# Patient Record
Sex: FEMALE | Race: WHITE | Employment: UNEMPLOYED | URBAN - METROPOLITAN AREA
[De-identification: names, ages, dates, MRNs, and addresses within clinical notes are randomized per-mention and may not be internally consistent; named-entity substitution may affect disease eponyms.]

---

## 2017-11-01 ENCOUNTER — HOSPITAL ENCOUNTER (OUTPATIENT)
Age: 57
Discharge: HOME | End: 2017-11-01
Payer: COMMERCIAL

## 2017-11-01 LAB
T3 FREE: 2.73 PG/ML (ref 2.02–4.43)
THYROXINE, FREE: 0.98 NG/DL (ref 0.93–1.7)
TSH SERPL DL<=0.05 MIU/L-ACNC: 2.22 MIU/L (ref 0.3–5)

## 2017-11-01 PROCEDURE — 36415 COLL VENOUS BLD VENIPUNCTURE: CPT

## 2017-11-01 PROCEDURE — 84481 FREE ASSAY (FT-3): CPT

## 2017-11-01 PROCEDURE — 84443 ASSAY THYROID STIM HORMONE: CPT

## 2017-11-01 PROCEDURE — 84439 ASSAY OF FREE THYROXINE: CPT

## 2017-11-09 ENCOUNTER — HOSPITAL ENCOUNTER (OUTPATIENT)
Age: 57
Discharge: HOME | End: 2017-11-09
Payer: COMMERCIAL

## 2017-11-09 LAB
ALBUMIN SERPL-MCNC: 4.3 G/DL (ref 3.5–5.2)
ALBUMIN/GLOBULIN RATIO: ABNORMAL (ref 1–2.5)
ALP BLD-CCNC: 71 U/L (ref 35–104)
ALT SERPL-CCNC: 13 U/L (ref 5–33)
AMOUNT GLUCOSE GIVEN: 75 G
ANION GAP SERPL CALCULATED.3IONS-SCNC: 11 MMOL/L (ref 9–17)
AST SERPL-CCNC: 16 U/L
BILIRUB SERPL-MCNC: 0.24 MG/DL (ref 0.3–1.2)
BUN BLDV-MCNC: 11 MG/DL (ref 6–20)
BUN/CREAT BLD: 14 (ref 9–20)
CALCIUM SERPL-MCNC: 9.2 MG/DL (ref 8.6–10.4)
CHLORIDE BLD-SCNC: 105 MMOL/L (ref 98–107)
CHOLESTEROL, FASTING: 261 MG/DL
CHOLESTEROL/HDL RATIO: 5.8
CO2: 26 MMOL/L (ref 20–31)
CREAT SERPL-MCNC: 0.78 MG/DL (ref 0.5–0.9)
GFR AFRICAN AMERICAN: >60 ML/MIN
GFR NON-AFRICAN AMERICAN: >60 ML/MIN
GFR SERPL CREATININE-BSD FRML MDRD: ABNORMAL ML/MIN/{1.73_M2}
GFR SERPL CREATININE-BSD FRML MDRD: ABNORMAL ML/MIN/{1.73_M2}
GLUCOSE FASTING: 94 MG/DL (ref 70–99)
GLUCOSE FASTING: 95 MG/DL (ref 65–99)
GLUCOSE TOLERANCE TEST 1 HOUR: 103 MG/DL (ref 65–184)
GLUCOSE TOLERANCE TEST 2 HOUR: 103 MG/DL (ref 65–139)
GLUCOSE TOLERANCE TEST 3 HOUR: 60 MG/DL (ref 65–130)
GLUCOSE, 4 HOUR: 84 MG/DL (ref 65–125)
GLUCOSE, 5 HR: 85 MG/DL (ref 65–109)
HCT VFR BLD CALC: 47.5 % (ref 36–46)
HDLC SERPL-MCNC: 45 MG/DL
HEMOGLOBIN: 15.9 G/DL (ref 12–16)
IRON SATURATION: 35 % (ref 20–55)
IRON: 99 UG/DL (ref 37–145)
LDL CHOLESTEROL: 161 MG/DL (ref 0–130)
MCH RBC QN AUTO: 30.8 PG (ref 26–34)
MCHC RBC AUTO-ENTMCNC: 33.6 G/DL (ref 31–37)
MCV RBC AUTO: 91.7 FL (ref 80–100)
PDW BLD-RTO: 13.2 % (ref 11.5–14.5)
PLATELET # BLD: 247 K/UL (ref 130–400)
PMV BLD AUTO: 7.9 FL (ref 6–12)
POTASSIUM SERPL-SCNC: 4 MMOL/L (ref 3.7–5.3)
RBC # BLD: 5.18 M/UL (ref 4–5.2)
SODIUM BLD-SCNC: 142 MMOL/L (ref 135–144)
TOTAL IRON BINDING CAPACITY: 282 UG/DL (ref 250–450)
TOTAL PROTEIN: 6.8 G/DL (ref 6.4–8.3)
TRIGLYCERIDE, FASTING: 275 MG/DL
UNSATURATED IRON BINDING CAPACITY: 183 UG/DL (ref 112–347)
VITAMIN D 25-HYDROXY: 35.5 NG/ML (ref 30–100)
VLDLC SERPL CALC-MCNC: ABNORMAL MG/DL (ref 1–30)
WBC # BLD: 7.6 K/UL (ref 3.5–11)

## 2017-11-09 PROCEDURE — 82627 DEHYDROEPIANDROSTERONE: CPT

## 2017-11-09 PROCEDURE — 83550 IRON BINDING TEST: CPT

## 2017-11-09 PROCEDURE — 82952 GTT-ADDED SAMPLES: CPT

## 2017-11-09 PROCEDURE — 82306 VITAMIN D 25 HYDROXY: CPT

## 2017-11-09 PROCEDURE — 85027 COMPLETE CBC AUTOMATED: CPT

## 2017-11-09 PROCEDURE — 80053 COMPREHEN METABOLIC PANEL: CPT

## 2017-11-09 PROCEDURE — 80061 LIPID PANEL: CPT

## 2017-11-09 PROCEDURE — 82951 GLUCOSE TOLERANCE TEST (GTT): CPT

## 2017-11-09 PROCEDURE — 36415 COLL VENOUS BLD VENIPUNCTURE: CPT

## 2017-11-09 PROCEDURE — 83540 ASSAY OF IRON: CPT

## 2017-11-10 LAB — DHEAS (DHEA SULFATE): 52.1 UG/DL (ref 26–200)

## 2017-12-19 ENCOUNTER — HOSPITAL ENCOUNTER (OUTPATIENT)
Age: 57
Discharge: HOME | End: 2017-12-19
Payer: COMMERCIAL

## 2017-12-19 LAB
ALT SERPL-CCNC: 13 U/L (ref 5–33)
CHOLESTEROL, FASTING: 182 MG/DL
CHOLESTEROL/HDL RATIO: 4.2
HDLC SERPL-MCNC: 43 MG/DL
LDL CHOLESTEROL: 77 MG/DL (ref 0–130)
TRIGLYCERIDE, FASTING: 312 MG/DL
VLDLC SERPL CALC-MCNC: ABNORMAL MG/DL (ref 1–30)

## 2017-12-19 PROCEDURE — 80061 LIPID PANEL: CPT

## 2017-12-19 PROCEDURE — 36415 COLL VENOUS BLD VENIPUNCTURE: CPT

## 2017-12-19 PROCEDURE — 84460 ALANINE AMINO (ALT) (SGPT): CPT

## 2017-12-19 PROCEDURE — 82523 COLLAGEN CROSSLINKS: CPT

## 2017-12-21 LAB
CREATININE URINE /VOLUME: 245 MG/DL
N-TELO/CREAT. RATIO: 56

## 2018-01-08 ENCOUNTER — TELEPHONE (OUTPATIENT)
Dept: ONCOLOGY | Age: 58
End: 2018-01-08

## 2018-01-09 ENCOUNTER — TELEPHONE (OUTPATIENT)
Dept: ONCOLOGY | Age: 58
End: 2018-01-09

## 2018-01-10 DIAGNOSIS — M81.8 IDIOPATHIC OSTEOPOROSIS: ICD-10-CM

## 2018-01-10 DIAGNOSIS — E05.90 THYROTOXICOSIS WITHOUT THYROID STORM, UNSPECIFIED THYROTOXICOSIS TYPE: ICD-10-CM

## 2018-01-15 ENCOUNTER — HOSPITAL ENCOUNTER (OUTPATIENT)
Facility: MEDICAL CENTER | Age: 58
End: 2018-01-15
Payer: COMMERCIAL

## 2018-01-16 ENCOUNTER — HOSPITAL ENCOUNTER (OUTPATIENT)
Dept: INFUSION THERAPY | Facility: MEDICAL CENTER | Age: 58
Discharge: HOME | End: 2018-01-16
Payer: COMMERCIAL

## 2018-01-16 VITALS
RESPIRATION RATE: 18 BRPM | HEART RATE: 57 BPM | DIASTOLIC BLOOD PRESSURE: 70 MMHG | SYSTOLIC BLOOD PRESSURE: 141 MMHG | TEMPERATURE: 97.8 F

## 2018-01-16 DIAGNOSIS — E05.90 THYROTOXICOSIS WITHOUT THYROID STORM, UNSPECIFIED THYROTOXICOSIS TYPE: ICD-10-CM

## 2018-01-16 DIAGNOSIS — M81.8 IDIOPATHIC OSTEOPOROSIS: ICD-10-CM

## 2018-01-16 LAB
CORTISOL COLLECTION INFO: NORMAL
CORTISOL COLLECTION INFO: NORMAL
CORTISOL: 13.7 UG/DL (ref 2.7–18.4)
CORTISOL: 16.2 UG/DL (ref 2.7–18.4)

## 2018-01-16 PROCEDURE — 36415 COLL VENOUS BLD VENIPUNCTURE: CPT

## 2018-01-16 PROCEDURE — 2580000003 HC RX 258: Performed by: INTERNAL MEDICINE

## 2018-01-16 PROCEDURE — 96374 THER/PROPH/DIAG INJ IV PUSH: CPT

## 2018-01-16 PROCEDURE — 96372 THER/PROPH/DIAG INJ SC/IM: CPT

## 2018-01-16 PROCEDURE — 82533 TOTAL CORTISOL: CPT

## 2018-01-16 PROCEDURE — 6360000002 HC RX W HCPCS: Performed by: INTERNAL MEDICINE

## 2018-01-16 RX ADMIN — COSYNTROPIN 1 MCG: 0.25 INJECTION, POWDER, LYOPHILIZED, FOR SOLUTION INTRAMUSCULAR; INTRAVENOUS at 10:29

## 2018-01-16 NOTE — PROGRESS NOTES
Pt. Arrives for Cortrosyn stim testing , Pt. Instructed and IV started with ease. Cortrosyn given and lab notified of 30 minute draw and 60 minute draw. Blood collected  Pt. Discharged per ambulatory.

## 2018-02-23 ENCOUNTER — HOSPITAL ENCOUNTER (OUTPATIENT)
Age: 58
Discharge: HOME | End: 2018-02-23
Payer: COMMERCIAL

## 2018-02-23 LAB
ALT SERPL-CCNC: 11 U/L (ref 5–33)
CHOLESTEROL, FASTING: 197 MG/DL
CHOLESTEROL/HDL RATIO: 3.9
HDLC SERPL-MCNC: 51 MG/DL
LDL CHOLESTEROL: 108 MG/DL (ref 0–130)
T3 FREE: 2.61 PG/ML (ref 2.02–4.43)
THYROXINE, FREE: 1.01 NG/DL (ref 0.93–1.7)
TRIGLYCERIDE, FASTING: 190 MG/DL
TSH SERPL DL<=0.05 MIU/L-ACNC: 5.62 MIU/L (ref 0.3–5)
VLDLC SERPL CALC-MCNC: ABNORMAL MG/DL (ref 1–30)

## 2018-02-23 PROCEDURE — 84481 FREE ASSAY (FT-3): CPT

## 2018-02-23 PROCEDURE — 84443 ASSAY THYROID STIM HORMONE: CPT

## 2018-02-23 PROCEDURE — 36415 COLL VENOUS BLD VENIPUNCTURE: CPT

## 2018-02-23 PROCEDURE — 80061 LIPID PANEL: CPT

## 2018-02-23 PROCEDURE — 84439 ASSAY OF FREE THYROXINE: CPT

## 2018-02-23 PROCEDURE — 84460 ALANINE AMINO (ALT) (SGPT): CPT

## 2018-03-06 ENCOUNTER — HOSPITAL ENCOUNTER (OUTPATIENT)
Age: 58
Discharge: HOME | End: 2018-03-06
Payer: COMMERCIAL

## 2018-03-06 PROCEDURE — 36415 COLL VENOUS BLD VENIPUNCTURE: CPT

## 2018-03-06 PROCEDURE — 82024 ASSAY OF ACTH: CPT

## 2018-03-07 LAB — ADRENOCORTICOTROPIC HORMONE: 12 PG/ML (ref 6–58)

## 2018-05-12 ENCOUNTER — HOSPITAL ENCOUNTER (OUTPATIENT)
Dept: MAMMOGRAPHY | Age: 58
Discharge: HOME | End: 2018-05-14
Payer: COMMERCIAL

## 2018-05-12 DIAGNOSIS — Z13.820 SCREENING FOR OSTEOPOROSIS: ICD-10-CM

## 2018-05-12 PROCEDURE — 77080 DXA BONE DENSITY AXIAL: CPT

## 2018-06-05 ENCOUNTER — HOSPITAL ENCOUNTER (OUTPATIENT)
Age: 58
Discharge: HOME | End: 2018-06-05
Payer: COMMERCIAL

## 2018-06-05 LAB
ALBUMIN SERPL-MCNC: 4.8 G/DL (ref 3.5–5.2)
ALBUMIN/GLOBULIN RATIO: ABNORMAL (ref 1–2.5)
ALP BLD-CCNC: 47 U/L (ref 35–104)
ALT SERPL-CCNC: 18 U/L (ref 5–33)
ANION GAP SERPL CALCULATED.3IONS-SCNC: 11 MMOL/L (ref 9–17)
AST SERPL-CCNC: 20 U/L
BILIRUB SERPL-MCNC: 0.22 MG/DL (ref 0.3–1.2)
BUN BLDV-MCNC: 19 MG/DL (ref 6–20)
BUN/CREAT BLD: 19 (ref 9–20)
CALCIUM SERPL-MCNC: 9.1 MG/DL (ref 8.6–10.4)
CHLORIDE BLD-SCNC: 105 MMOL/L (ref 98–107)
CHOLESTEROL, FASTING: 193 MG/DL
CHOLESTEROL/HDL RATIO: 3.1
CO2: 27 MMOL/L (ref 20–31)
CREAT SERPL-MCNC: 0.99 MG/DL (ref 0.5–0.9)
GFR AFRICAN AMERICAN: >60 ML/MIN
GFR NON-AFRICAN AMERICAN: 58 ML/MIN
GFR SERPL CREATININE-BSD FRML MDRD: ABNORMAL ML/MIN/{1.73_M2}
GFR SERPL CREATININE-BSD FRML MDRD: ABNORMAL ML/MIN/{1.73_M2}
GLUCOSE FASTING: 104 MG/DL (ref 70–99)
HDLC SERPL-MCNC: 63 MG/DL
LDL CHOLESTEROL: 104 MG/DL (ref 0–130)
POTASSIUM SERPL-SCNC: 4 MMOL/L (ref 3.7–5.3)
PTH INTACT: 43.31 PG/ML (ref 15–65)
SODIUM BLD-SCNC: 143 MMOL/L (ref 135–144)
T3 FREE: 2.83 PG/ML (ref 2.02–4.43)
THYROXINE, FREE: 1.19 NG/DL (ref 0.93–1.7)
TOTAL PROTEIN: 7.3 G/DL (ref 6.4–8.3)
TRIGLYCERIDE, FASTING: 131 MG/DL
TSH SERPL DL<=0.05 MIU/L-ACNC: 6.18 MIU/L (ref 0.3–5)
VITAMIN D 25-HYDROXY: 41.5 NG/ML (ref 30–100)
VLDLC SERPL CALC-MCNC: NORMAL MG/DL (ref 1–30)

## 2018-06-05 PROCEDURE — 36415 COLL VENOUS BLD VENIPUNCTURE: CPT

## 2018-06-05 PROCEDURE — 84443 ASSAY THYROID STIM HORMONE: CPT

## 2018-06-05 PROCEDURE — 84439 ASSAY OF FREE THYROXINE: CPT

## 2018-06-05 PROCEDURE — 83970 ASSAY OF PARATHORMONE: CPT

## 2018-06-05 PROCEDURE — 80061 LIPID PANEL: CPT

## 2018-06-05 PROCEDURE — 82306 VITAMIN D 25 HYDROXY: CPT

## 2018-06-05 PROCEDURE — 84481 FREE ASSAY (FT-3): CPT

## 2018-06-05 PROCEDURE — 82523 COLLAGEN CROSSLINKS: CPT

## 2018-06-05 PROCEDURE — 80053 COMPREHEN METABOLIC PANEL: CPT

## 2018-06-07 LAB
CREATININE URINE /VOLUME: 233 MG/DL
N-TELO/CREAT. RATIO: 15

## 2024-05-21 ENCOUNTER — OFFICE VISIT (OUTPATIENT)
Dept: ORTHOPEDIC SURGERY | Age: 64
End: 2024-05-21
Payer: COMMERCIAL

## 2024-05-21 VITALS — BODY MASS INDEX: 20.94 KG/M2 | WEIGHT: 122 LBS

## 2024-05-21 DIAGNOSIS — M25.552 LEFT HIP PAIN: Primary | ICD-10-CM

## 2024-05-21 DIAGNOSIS — F17.200 SMOKER: ICD-10-CM

## 2024-05-21 DIAGNOSIS — G89.29 CHRONIC BILATERAL LOW BACK PAIN WITHOUT SCIATICA: ICD-10-CM

## 2024-05-21 DIAGNOSIS — M54.50 CHRONIC BILATERAL LOW BACK PAIN WITHOUT SCIATICA: ICD-10-CM

## 2024-05-21 PROCEDURE — G8420 CALC BMI NORM PARAMETERS: HCPCS | Performed by: PHYSICIAN ASSISTANT

## 2024-05-21 PROCEDURE — 4004F PT TOBACCO SCREEN RCVD TLK: CPT | Performed by: PHYSICIAN ASSISTANT

## 2024-05-21 PROCEDURE — 3017F COLORECTAL CA SCREEN DOC REV: CPT | Performed by: PHYSICIAN ASSISTANT

## 2024-05-21 PROCEDURE — G8428 CUR MEDS NOT DOCUMENT: HCPCS | Performed by: PHYSICIAN ASSISTANT

## 2024-05-21 PROCEDURE — 99203 OFFICE O/P NEW LOW 30 MIN: CPT | Performed by: PHYSICIAN ASSISTANT

## 2024-05-21 RX ORDER — CYCLOBENZAPRINE HCL 10 MG
10 TABLET ORAL 3 TIMES DAILY PRN
Qty: 21 TABLET | Refills: 0 | Status: SHIPPED | OUTPATIENT
Start: 2024-05-21 | End: 2024-05-31

## 2024-05-21 NOTE — PROGRESS NOTES
Patient ID: Ghada Gillis is a 63 y.o. female    Chief Compliant:  Chief Complaint   Patient presents with    Leg Pain     NP: Lt leg/hip pain      HPI:  Patient is a 63 y.o. female who presents to the clinic today for for evaluation of low back pain with left leg numbness and tingling.  Patient reports that most of her pain is to the lateral aspect of her left hip.  Patient has had ongoing pain for the past 2 months with any fall trauma or injury.  Patient's pain is alleviated with sitting but worse with standing and walking.  She also saw her internal medicine doctor on 5/14 who ordered x-rays of her low back and hip.  X-rays demonstrated some mild arthritis of her left hip as well as some multilevel degenerative changes throughout her lumbar spine no evidence of any fractures or osseous abnormality.  Patient denies any fever, chills, bowel or bladder concern, saddle anesthesia.      Review of Systems   Constitutional: Negative for fever, chills, sweats.   Eyes: Negative for changes in vision, or pain.   HENT: Negative for ear ache, epistaxis, or sore throat.  Respiratory/Cardio: Negative for Chest pain, palpitations, SOB, or cough.  Gastrointestinal: Negative for abdominal pain, N/V/D.   Genitourinary: Negative for dysuria, frequency, urgency, or hematuria.   Neurological: Negative for headache, numbness, or weakness.   Integumentary: Negative for rash, itching, laceration, or abrasion.   Musculoskeletal: Positive for Leg Pain (NP: Lt leg/hip pain)         Past History:    Current Outpatient Medications:     cyclobenzaprine (FLEXERIL) 10 MG tablet, Take 1 tablet by mouth 3 times daily as needed for Muscle spasms, Disp: 21 tablet, Rfl: 0    ondansetron (ZOFRAN ODT) 4 MG disintegrating tablet, Take 1 tablet by mouth every 8 hours as needed for Nausea, Disp: 20 tablet, Rfl: 0    tiotropium (SPIRIVA) 18 MCG inhalation capsule, Inhale 18 mcg into the lungs daily., Disp: , Rfl:     albuterol (PROVENTIL) (5 MG/ML)

## 2024-05-30 ENCOUNTER — HOSPITAL ENCOUNTER (OUTPATIENT)
Dept: PHYSICAL THERAPY | Age: 64
Setting detail: THERAPIES SERIES
Discharge: HOME | End: 2024-05-30
Attending: PHYSICIAN ASSISTANT
Payer: COMMERCIAL

## 2024-05-30 PROCEDURE — 97161 PT EVAL LOW COMPLEX 20 MIN: CPT

## 2024-05-30 PROCEDURE — 97110 THERAPEUTIC EXERCISES: CPT

## 2024-05-30 NOTE — CONSULTS
with compression  04569    [x] Therapeutic Activity  07693 [x] Cold/hotpack    [x] Gait Training   15082 [x] Lumbar/Cervical Traction  18988   [x] Neuromuscular Re-education  70631 [x] Electrical Stimulation Unattended  37871   [x] Manual Therapy    35924 [x] Electrical Stimulation Attended  90031   [] Iontophoresis: 4 mg/mL Dexamethasone Sodium Phosphate  mAmin  60507 []  Medication allergies reviewed for use of   Dexamethasone Sodium Phosphate 4mg/ml with iontophoresis treatments.Pt is not allergic.   [] Dry Needling, 1 or 2 muscles  20560   [] Dry Needling, 3 or more muscles  20561     Frequency:  2 x/week for 12 visits    Today’s Treatment:  Precautions:  Modalities: hot pack to left hip in supine x 10 min.   Exercises:  Access Code: [email or web address]  URL: [email or web address]  Date: 05/30/2024  Prepared by: Virgil Barton    Exercises  - Static Prone on Elbows  - 1 x daily - 4 x weekly - 3 sets - 10 reps  - Prone Press Up On Elbows  - 1 x daily - 4 x weekly - 3 sets - 10 reps  - Prone Knee Flexion  - 1 x daily - 4 x weekly - 3 sets - 10 reps  - Prone Gluteal Sets  - 1 x daily - 4 x weekly - 3 sets - 10 reps  - Supine Hip Adduction Isometric with Ball  - 1 x daily - 4 x weekly - 3 sets - 10 reps  - Bent Knee Fallouts with Alternating Legs  - 1 x daily - 4 x weekly - 3 sets - 10 reps  - Supine Bridge  - 1 x daily - 4 x weekly - 3 sets - 10 reps    Pain altered Tx:  [] Yes  [x] No  Action:    Pt. Education:  [x] Plans/Goals, Risks/Benefits discussed  [x] Home exercise program:   Method of Education: [x] Verbal  [x] Demo  [x] Written  Comprehension of Education:  [x] Verbalizes understanding.  [x] Demonstrates understanding.  [x] Needs Review.  [] Demonstrates/verbalizes understanding of HEP/Ed previously given.      Specific Instructions for next treatment::L hip ROM, strengthening, core strengthening       Evaluation Complexity:  History (Personal factors, comorbidities) [] 0 [x] 1-2 [] 3+   Exam

## 2024-06-04 ENCOUNTER — HOSPITAL ENCOUNTER (OUTPATIENT)
Dept: PHYSICAL THERAPY | Age: 64
Setting detail: THERAPIES SERIES
Discharge: HOME | End: 2024-06-04
Attending: PHYSICIAN ASSISTANT
Payer: COMMERCIAL

## 2024-06-04 PROCEDURE — 97110 THERAPEUTIC EXERCISES: CPT

## 2024-06-04 NOTE — FLOWSHEET NOTE
[] No  [] Yes  Action:  Comments: Minimal pain this morning but increased to 4/10 while walking into clinic. She still has pain with prolonged walking like attempting to grocery shop.     Objective:  Modalities:  Hot pack declined.   Precautions:  Exercises: Bilateral   Exercise Reps/ Time Weight/ Level Comments   Nustep  5 min  L4  Supervised, discussed subjective, reviewed HEP.          Standing       Calf stretch  3x20\"      4 way hip  15x     Marching  15x     HS curls  15x                       Seated      HS stretch  3x20\"           Supine       Figure 4 stretch  3x20\"      Butterfly stretch  10x10\"      ER/IR  15x  With knee extended    Hip adduction  15x3\"     Hip abd slides  15x     SLR 10x     Heel slides  15x     Bridges    ADD         Sidelying       Clamshells 15x     Hip abduct 15x                       Other:      Treatment Charges: Mins Units   []  Modalities     [x]  Ther Exercise 35 2   []  Manual Therapy     []  Ther Activities     []  Neuro Re-ed     []  Vasocompression     [] Gait     []  Other     Total Billable time 35 2       Assessment: [x] Progressing toward goals. Patient demonstrates fair tolerance to exercises although her hip pain increased during majority of program. It did quickly subside with rest. Her general hip strength and endurance is limited and needs improvement. Declined need for hot pack at end of session.     [] No change.     [] Other:  [x] Patient will benefit from physical therapy to improve L hip and trunk ROM, strength, normalize gait tolerance and improve ADLs.       STG/LTG  STG: (to be met in 8 treatments)  ? Pain: 2/10 L hip and back pain with ambulation.   ? ROM: L hip flexion to 110, abduction to 40, and ER to 35 degrees to improve ADLs.   ? Strength: 4+/5 L hip flexion and abduction to improve joint stability and standing   ? Function: Oswestry score 36% impaired or better to improve ADLs.   Independent with Home Exercise Programs.     LTG: (to be met in 12

## 2024-06-06 ENCOUNTER — HOSPITAL ENCOUNTER (OUTPATIENT)
Dept: PHYSICAL THERAPY | Age: 64
Setting detail: THERAPIES SERIES
Discharge: HOME | End: 2024-06-06
Attending: PHYSICIAN ASSISTANT
Payer: COMMERCIAL

## 2024-06-06 NOTE — FLOWSHEET NOTE
[x] [facility]  Outpatient Rehabilitation &  Therapy  [address]  [phone]  [phone]     Physical Therapy Daily Treatment Note    Date:  2024  Patient Name:  Ghada Gillis    :  1960  MRN: [id number]  Physician: BRIAN Mcduffie                Insurance: [employer], Auth after 12  Medical Diagnosis:   Left hip pain (M25.552) Chronic bilateral low back pain without sciatica (M54.50,G89.29)        Rehab Codes: M25.552, M54.5   Onset Date: 3/3/24                                 Next 's appt: 24    Visit# / total visits: 3/12; Progress note for Medicare patient due at visit 10     Cancels/No Shows: 0/0    Subjective:    Pain:  [x] Yes  [] No Location: L hip   Pain Rating: (0-10 scale) 4/10  Pain altered Tx:  [] No  [] Yes  Action:  Comments: Patient reports continued pain at 4/10. Reports she is sore from previous treatment. Pain is more in lateral hip today as opposed to groin where it usually is.     Objective:  Modalities:  Hot pack declined.   Precautions:  Exercises: Bilateral   Exercise Reps/ Time Weight/ Level Comments   Nustep  5 min  L4           Standing       Calf stretch  3x20\"      4 way hip  15x     Marching  15x     HS curls  15x                       Seated      HS stretch  3x20\"           Supine       Figure 4 stretch  3x20\"      Butterfly stretch  10x10\"      ER/IR  15x  With knee extended    Hip adduction  15x3\"     Hip abd slides  15x     SLR 10x     Heel slides  15x     Bridges  15x  Cues for glut squeeze  Added 6/6         Sidelying       Clamshells 15x     Hip abduct 15x                       Other:      Treatment Charges: Mins Units   []  Modalities     [x]  Ther Exercise 35 2   []  Manual Therapy     []  Ther Activities     []  Neuro Re-ed     []  Vasocompression     [] Gait     []  Other     Total Billable time 35 2       Assessment: [x] Progressing toward goals. Added bridges for continued glut strengthening with good carryover.

## 2024-06-11 ENCOUNTER — HOSPITAL ENCOUNTER (OUTPATIENT)
Dept: PHYSICAL THERAPY | Age: 64
Setting detail: THERAPIES SERIES
Discharge: HOME | End: 2024-06-11
Attending: PHYSICIAN ASSISTANT
Payer: COMMERCIAL

## 2024-06-11 NOTE — FLOWSHEET NOTE
[x] [facility]  Outpatient Rehabilitation &  Therapy  [address]  [phone]  [phone]     Physical Therapy Daily Treatment Note    Date:  2024  Patient Name:  Ghada Gillis    :  1960  MRN: [id number]  Physician: BRIAN Mcduffie                Insurance: [place] [employer] Dual, [facility] after 12  Medical Diagnosis:   Left hip pain (M25.552) Chronic bilateral low back pain without sciatica (M54.50,G89.29)        Rehab Codes: M25.552, M54.5   Onset Date: 3/3/24                                 Next 's appt: 24  Visit# / total visits: ; Progress note for Medicare patient due at visit 10     Cancels/No Shows: 0/0    Subjective:    Pain:  [x] Yes  [] No Location: L hip   Pain Rating: (0-10 scale) 3/10  Pain altered Tx:  [] No  [] Yes  Action:  Comments: Patient reports compliance with HEP still having severe hip pain at times.     Objective:  Modalities:  Hot pack declined.   Precautions:  Exercises: Bilateral   Exercise Reps/ Time Weight/ Level Comments   Nustep  5 min  L4           Standing       Calf stretch  3x20\"      4 way hip  20x     Marching  20x     HS curls  20x                       Seated      HS stretch  3x20\"           Supine     Add weight nxt tx   Figure 4 stretch  3x20\"      Butterfly stretch  10x10\"      ER/IR  20x  With knee extended    Hip adduction  20x3\"     Hip abd slides  20x     SLR 2x10x     Heel slides  20x     Bridges  20x  Cues for glut squeeze  Added 6/6         Sidelying       Clamshells 20x     Hip abduct 20x                       Other:      Treatment Charges: Mins Units   []  Modalities     [x]  Ther Exercise 30 2   []  Manual Therapy     []  Ther Activities     []  Neuro Re-ed     []  Vasocompression     [] Gait     []  Other     Total Billable time 30 2       Assessment: [x] Progressing toward goals. Patient still struggles with standing program citing increased pain but she seemed to tolerate supine exercises well even with increased

## 2024-06-13 ENCOUNTER — HOSPITAL ENCOUNTER (OUTPATIENT)
Dept: PHYSICAL THERAPY | Age: 64
Setting detail: THERAPIES SERIES
Discharge: HOME | End: 2024-06-13
Attending: PHYSICIAN ASSISTANT
Payer: COMMERCIAL

## 2024-06-13 PROCEDURE — 97110 THERAPEUTIC EXERCISES: CPT

## 2024-06-18 ENCOUNTER — HOSPITAL ENCOUNTER (OUTPATIENT)
Dept: PHYSICAL THERAPY | Age: 64
Setting detail: THERAPIES SERIES
Discharge: HOME | End: 2024-06-18
Attending: PHYSICIAN ASSISTANT
Payer: COMMERCIAL

## 2024-06-18 PROCEDURE — 97110 THERAPEUTIC EXERCISES: CPT

## 2024-06-18 NOTE — FLOWSHEET NOTE
Charges: Mins Units   []  Modalities     [x]  Ther Exercise 35 2   []  Manual Therapy     []  Ther Activities     []  Neuro Re-ed     []  Vasocompression     [] Gait     []  Other     Total Billable time 35 2       Assessment: [x] Progressing toward goals. Patient had some breathing issues during standing exercise did have to use her inhaler and take a short rest break. She recovered well and was able to complete the program. L hip ROM and endurance appear to be improving she has no pain complaints during session.     [] No change.     [x] Other: Patient pacing is still an issue, tends to rush through exercise.   [x] Patient will benefit from physical therapy to improve L hip and trunk ROM, strength, normalize gait tolerance and improve ADLs.         STG: (to be met in 8 treatments)  ? Pain: 2/10 L hip and back pain with ambulation.   ? ROM: L hip flexion to 110, abduction to 40, and ER to 35 degrees to improve ADLs.   ? Strength: 4+/5 L hip flexion and abduction to improve joint stability and standing   ? Function: Oswestry score 36% impaired or better to improve ADLs.   Independent with Home Exercise Programs.     LTG: (to be met in 12 treatments)  Patient will be able to climb and 8 inch step.   Patient will be able to perform 10 second L sided SLS.     Pt. Education:  [x] Yes  [] No  [x] Reviewed Prior HEP/Ed  Method of Education: [x] Verbal  [] Demo  [] Written  Comprehension of Education:  [x] Verbalizes understanding.  [x] Demonstrates understanding.  [] Needs review.  [] Demonstrates/verbalizes HEP/Ed previously given.     Plan: [x] Continue current frequency toward long and short term goals.    [x] Specific Instructions for subsequent treatments:  L hip ROM, strengthening, core strengthening       Time In:  1050          Time Out: 1125    Electronically signed by:  Frederick Barton, PT

## 2024-06-20 ENCOUNTER — HOSPITAL ENCOUNTER (OUTPATIENT)
Dept: PHYSICAL THERAPY | Age: 64
Setting detail: THERAPIES SERIES
Discharge: HOME | End: 2024-06-20
Attending: PHYSICIAN ASSISTANT
Payer: COMMERCIAL

## 2024-06-20 PROCEDURE — 97110 THERAPEUTIC EXERCISES: CPT

## 2024-06-20 PROCEDURE — 97530 THERAPEUTIC ACTIVITIES: CPT

## 2024-06-20 NOTE — FLOWSHEET NOTE
Other:      Treatment Charges: Mins Units   []  Modalities     [x]  Ther Exercise 35 2   []  Manual Therapy     [x]  Ther Activities 5 1   []  Neuro Re-ed     []  Vasocompression     [] Gait     []  Other     Total Billable time 40 3       Assessment: [x] Progressing toward goals. Cueing for diaphragmatic and pursed lip breathing throughout Tx for maximize O2 intake/output and conserve energy levels whilst completing charted therex. Overall good tolerance with ex's completed - most limited due to poor breathing techniques and pt feeling SOB due to this. Educated on box breathing technique and issued handout for pt to work on this at home.     [] No change.     [x] Other: Patient pacing is still an issue, tends to rush through exercise - cueing to slow ex's and focus on breathing.     [x] Patient will benefit from physical therapy to improve L hip and trunk ROM, strength, normalize gait tolerance and improve ADLs.         STG: (to be met in 8 treatments)  ? Pain: 2/10 L hip and back pain with ambulation.   ? ROM: L hip flexion to 110, abduction to 40, and ER to 35 degrees to improve ADLs.   ? Strength: 4+/5 L hip flexion and abduction to improve joint stability and standing   ? Function: Oswestry score 36% impaired or better to improve ADLs.   Independent with Home Exercise Programs.     LTG: (to be met in 12 treatments)  Patient will be able to climb and 8 inch step.   Patient will be able to perform 10 second L sided SLS.     Pt. Education:  [x] Yes  [] No  [x] Reviewed Prior HEP/Ed  Method of Education: [x] Verbal  [] Demo  [] Written  Comprehension of Education:  [x] Verbalizes understanding.  [x] Demonstrates understanding.  [] Needs review.  [] Demonstrates/verbalizes HEP/Ed previously given.     Plan: [x] Continue current frequency toward long and short term goals.    [x] Specific Instructions for subsequent treatments:  L hip ROM, strengthening, core strengthening       Time In:  1050          Time

## 2024-06-25 ENCOUNTER — HOSPITAL ENCOUNTER (OUTPATIENT)
Dept: PHYSICAL THERAPY | Age: 64
Setting detail: THERAPIES SERIES
Discharge: HOME | End: 2024-06-25
Attending: PHYSICIAN ASSISTANT
Payer: COMMERCIAL

## 2024-06-25 PROCEDURE — 97110 THERAPEUTIC EXERCISES: CPT

## 2024-06-25 NOTE — PROGRESS NOTES
[x] [facility]  Outpatient Rehabilitation &  Therapy  [address]  [phone]  [phone] [] [facility]  Outpatient Rehabilitation &  Therapy  [address]  P: [phone]  F: [phone] [] [facility]  Outpatient Rehabilitation &  Therapy  [address]  P: [phone]  F: [phone] [] [facility]  Outpatient Rehabilitation &  Therapy  [address]  [phone]  [phone] [] [facility]  Outpatient Rehabilitation &  Therapy  [address]   P: [phone]  F: [phone]  [] [facility]  Outpatient Rehabilitation &  Therapy  [address]  P: [phone]  F: [phone] [] [facility]  Outpatient Rehabilitation &  Therapy  [address]  P: [phone]  F: [phone] [] [facility]  Outpatient Rehabilitation &  Therapy  [address]  P: [phone]  F: [phone] [] [facility]  Outpatient Rehabilitation &  Therapy  [address]  P: [phone]  F: [phone]  [] [facility] Outpatient Rehabilitation &  Therapy  [address]  P: [phone]  F: [phone]     Physical Therapy Progress Note    Date: 2024      Patient: Ghada Gillis  : 1960  MRN: [id number]    Physician: BRIAN Mcduffie                  Insurance: [facility] [employer] include [facility] beds at the [facility], [facility] after 12  Medical Diagnosis:   Left hip pain (M25.552) Chronic bilateral low back pain without sciatica (M54.50,G89.29)        Rehab Codes: M25.552, M54.5   Onset Date: 3/3/24                                 Next 's appt: 24  Visit# / total visits: ; Progress note for Medicare patient due at visit 10                                    Cancels/No Shows: 0/0  Date range of services: 24 to       Subjective:    Pain:  [x] Yes  [] No

## 2024-06-27 ENCOUNTER — HOSPITAL ENCOUNTER (OUTPATIENT)
Dept: PHYSICAL THERAPY | Age: 64
Setting detail: THERAPIES SERIES
Discharge: HOME | End: 2024-06-27
Attending: PHYSICIAN ASSISTANT
Payer: COMMERCIAL

## 2024-06-27 PROCEDURE — 97110 THERAPEUTIC EXERCISES: CPT

## 2024-06-27 NOTE — FLOWSHEET NOTE
[x] [facility]  Outpatient Rehabilitation &  Therapy  [address]  [phone]  [phone]     Physical Therapy Daily Treatment Note    Date:  2024  Patient Name:  Ghada Gillis      :  1960    MRN: [id number]  Physician: BRIAN Mcduffie                  Insurance: [employer] Dual, Auth after 12  Medical Diagnosis:   Left hip pain (M25.552) Chronic bilateral low back pain without sciatica (M54.50,G89.29)        Rehab Codes: M25.552, M54.5   Onset Date: 3/3/24                                 Next 's appt: 24  Visit# / total visits: ; Progress note for Medicare patient due at visit 10     Cancels/No Shows: 0/0    Subjective:    Pain:  [x] Yes  [] No Location: L hip     Pain Rating: (0-10 scale) 2/10  Pain altered Tx:  [x] No  [] Yes  Action:  Comments: Had groin pain when walking through the grocery store yesterday. Patient feels the need to externally rotate her hip when she is having pain when walking and this decreases her pain.     Objective:  Modalities:  Hot pack declined 24   Precautions: mild breathing issues/emphysema, has inhaler   Exercises: Bilateral   Exercise Reps/ Time Weight/ Level Comments 24     Nustep  5 min  L5  Inc level     Treadmill  3 min   Patient had coughing and mild difficulty breathing so ended  x   Standing        Calf stretch  3x30\"    x   Calf raise 20x 3#  X   4 way hip  2x10x  lime Progressed to band to focus on hip adductors  X   Marching  20x 3# Added ankle wt  X   HS curls  20x 3# Added ankle wt  X   Lunges  15x ea 3# Fwd and lateral ( difficult)  X   Step ups - fwd/lat 15x ea 3 lb  8 inch Added 24 X   TG squats  2x10x L35 Added  x          Seated       HS stretch  3x20\" ea   X   LAQ 20x 2 lb Added weight 24 X          Supine         Figure 4 stretch  3x20\"    X   Butterfly stretch  3x20\"    X   Hip adduction  20x3\"   x   SLR 2x10x ea 3# Added ankle wt  X   Bridges  20x  Cues for glut

## 2024-07-01 RX ORDER — ATORVASTATIN CALCIUM 20 MG/1
TABLET, FILM COATED ORAL
COMMUNITY
Start: 2023-08-21

## 2024-07-01 RX ORDER — UMECLIDINIUM 62.5 UG/1
AEROSOL, POWDER ORAL
COMMUNITY
Start: 2024-04-15

## 2024-07-01 RX ORDER — ALENDRONATE SODIUM 70 MG/1
TABLET ORAL
COMMUNITY

## 2024-07-01 RX ORDER — BUSPIRONE HYDROCHLORIDE 7.5 MG/1
TABLET ORAL
COMMUNITY
Start: 2024-01-22

## 2024-07-01 RX ORDER — PANTOPRAZOLE SODIUM 40 MG/1
TABLET, DELAYED RELEASE ORAL
COMMUNITY
Start: 2024-05-08

## 2024-07-01 RX ORDER — AMITRIPTYLINE HYDROCHLORIDE 10 MG/1
10 TABLET, FILM COATED ORAL NIGHTLY
COMMUNITY
Start: 2024-01-22

## 2024-07-02 ENCOUNTER — OFFICE VISIT (OUTPATIENT)
Dept: ORTHOPEDIC SURGERY | Age: 64
End: 2024-07-02
Payer: COMMERCIAL

## 2024-07-02 ENCOUNTER — HOSPITAL ENCOUNTER (OUTPATIENT)
Dept: PHYSICAL THERAPY | Age: 64
Setting detail: THERAPIES SERIES
Discharge: HOME | End: 2024-07-02
Attending: PHYSICIAN ASSISTANT

## 2024-07-02 VITALS — RESPIRATION RATE: 14 BRPM | WEIGHT: 122 LBS | HEIGHT: 64 IN | BODY MASS INDEX: 20.83 KG/M2

## 2024-07-02 DIAGNOSIS — G89.29 CHRONIC BILATERAL LOW BACK PAIN WITHOUT SCIATICA: ICD-10-CM

## 2024-07-02 DIAGNOSIS — M54.50 CHRONIC BILATERAL LOW BACK PAIN WITHOUT SCIATICA: ICD-10-CM

## 2024-07-02 DIAGNOSIS — F17.200 SMOKER: ICD-10-CM

## 2024-07-02 DIAGNOSIS — M25.552 LEFT HIP PAIN: Primary | ICD-10-CM

## 2024-07-02 PROCEDURE — 3017F COLORECTAL CA SCREEN DOC REV: CPT | Performed by: PHYSICIAN ASSISTANT

## 2024-07-02 PROCEDURE — 4004F PT TOBACCO SCREEN RCVD TLK: CPT | Performed by: PHYSICIAN ASSISTANT

## 2024-07-02 PROCEDURE — G8427 DOCREV CUR MEDS BY ELIG CLIN: HCPCS | Performed by: PHYSICIAN ASSISTANT

## 2024-07-02 PROCEDURE — 99213 OFFICE O/P EST LOW 20 MIN: CPT | Performed by: PHYSICIAN ASSISTANT

## 2024-07-02 PROCEDURE — G8420 CALC BMI NORM PARAMETERS: HCPCS | Performed by: PHYSICIAN ASSISTANT

## 2024-07-02 RX ORDER — CYCLOBENZAPRINE HCL 10 MG
10 TABLET ORAL 3 TIMES DAILY PRN
Qty: 21 TABLET | Refills: 0 | Status: SHIPPED | OUTPATIENT
Start: 2024-07-02 | End: 2024-07-12

## 2024-07-02 NOTE — FLOWSHEET NOTE
[x] [facility]  Outpatient Rehabilitation &  Therapy  [address]  [phone]  [phone]     Therapy Cancel/No Show note    Date: 2024  Patient: Ghada Gillis  : 1960  MRN: [id number]    Cancels/No Shows to date:     For today's appointment patient:    [x]  Cancelled    [] Rescheduled appointment    [] No-show     Reason given by patient:    []  Patient ill    []  Conflicting appointment    [] No transportation      [] Conflict with work    [] No reason given    [] Weather related    [] COVID-19    [x] Other:   Patient arrived from Ortho appointment stating that the doctor would like her to get an injection. Patient would like to hold off on PT at this time. Will put patient on hold for a few weeks.   Comments:  Patient's appointment for tomorrow also cancelled.      [] Next appointment was confirmed    Electronically signed by: NATALIA LUCAS PTA

## 2024-07-02 NOTE — PROGRESS NOTES
Patient ID: Ghada Gillis is a 63 y.o. female    Chief Compliant:  Chief Complaint   Patient presents with    Lower Back Pain    Hip Pain     Left hip pain      HPI:  This is a 63 y.o. female who presents to the clinic today for 6-week follow-up of ongoing low back pain, paresthesias to the lateral aspect down to her knee, and ongoing left hip and groin pain.  Patient reports that she did go to physical therapy which did seem to provide some temporary relief however she still has a lot of pulling in the groin.  Her x-rays show some mild arthritis of the left hip.  Most of her complaints today are generated from the groin as she feels some tightness.  She is did state that the Flexeril and physical therapy together seem to help mainly.  Patient has no other complaints she denies any recent injury trauma or fall.  Denies any bowel or bladder concerns, saddle anesthesia, fever, chills..       Review of Systems   All other systems reviewed and are negative.    Past History:    Current Outpatient Medications:     cyclobenzaprine (FLEXERIL) 10 MG tablet, Take 1 tablet by mouth 3 times daily as needed for Muscle spasms, Disp: 21 tablet, Rfl: 0    amitriptyline (ELAVIL) 10 MG tablet, Take 1 tablet by mouth nightly, Disp: , Rfl:     atorvastatin (LIPITOR) 20 MG tablet, , Disp: , Rfl:     busPIRone (BUSPAR) 7.5 MG tablet, TAKE 2 TABLETS BY MOUTH EVERY MORNING AND AT BEDTIME, Disp: , Rfl:     diclofenac sodium (VOLTAREN) 1 % GEL, Apply 2 g topically 4 times daily, Disp: , Rfl:     pantoprazole (PROTONIX) 40 MG tablet, , Disp: , Rfl:     INCRUSE ELLIPTA 62.5 MCG/ACT inhaler, INHALE ONE PUFF BY MOUTH EVERY MORNING, Disp: , Rfl:     alendronate (FOSAMAX) 70 MG tablet, , Disp: , Rfl:     ondansetron (ZOFRAN ODT) 4 MG disintegrating tablet, Take 1 tablet by mouth every 8 hours as needed for Nausea, Disp: 20 tablet, Rfl: 0    tiotropium (SPIRIVA) 18 MCG inhalation capsule, Inhale 18 mcg into the lungs daily., Disp: , Rfl:

## 2024-07-03 ENCOUNTER — HOSPITAL ENCOUNTER (OUTPATIENT)
Dept: PHYSICAL THERAPY | Age: 64
Setting detail: THERAPIES SERIES
Discharge: HOME | End: 2024-07-03
Attending: PHYSICIAN ASSISTANT

## 2024-07-09 ENCOUNTER — HOSPITAL ENCOUNTER (OUTPATIENT)
Dept: INTERVENTIONAL RADIOLOGY/VASCULAR | Age: 64
Discharge: HOME | End: 2024-07-11
Payer: COMMERCIAL

## 2024-07-09 ENCOUNTER — OFFICE VISIT (OUTPATIENT)
Dept: ORTHOPEDIC SURGERY | Age: 64
End: 2024-07-09
Payer: COMMERCIAL

## 2024-07-09 VITALS — HEIGHT: 64 IN | BODY MASS INDEX: 20.81 KG/M2 | RESPIRATION RATE: 14 BRPM | WEIGHT: 121.91 LBS

## 2024-07-09 VITALS
HEART RATE: 76 BPM | RESPIRATION RATE: 16 BRPM | OXYGEN SATURATION: 98 % | SYSTOLIC BLOOD PRESSURE: 145 MMHG | DIASTOLIC BLOOD PRESSURE: 82 MMHG

## 2024-07-09 DIAGNOSIS — M54.50 CHRONIC BILATERAL LOW BACK PAIN WITHOUT SCIATICA: Primary | ICD-10-CM

## 2024-07-09 DIAGNOSIS — M25.552 LEFT HIP PAIN: ICD-10-CM

## 2024-07-09 DIAGNOSIS — F17.200 SMOKER: ICD-10-CM

## 2024-07-09 DIAGNOSIS — G89.29 CHRONIC BILATERAL LOW BACK PAIN WITHOUT SCIATICA: Primary | ICD-10-CM

## 2024-07-09 PROCEDURE — G8420 CALC BMI NORM PARAMETERS: HCPCS | Performed by: PHYSICIAN ASSISTANT

## 2024-07-09 PROCEDURE — 6360000002 HC RX W HCPCS: Performed by: PHYSICIAN ASSISTANT

## 2024-07-09 PROCEDURE — 20610 DRAIN/INJ JOINT/BURSA W/O US: CPT

## 2024-07-09 PROCEDURE — 77002 NEEDLE LOCALIZATION BY XRAY: CPT

## 2024-07-09 PROCEDURE — 99213 OFFICE O/P EST LOW 20 MIN: CPT | Performed by: PHYSICIAN ASSISTANT

## 2024-07-09 PROCEDURE — 2500000003 HC RX 250 WO HCPCS: Performed by: PHYSICIAN ASSISTANT

## 2024-07-09 PROCEDURE — 6360000004 HC RX CONTRAST MEDICATION: Performed by: PHYSICIAN ASSISTANT

## 2024-07-09 PROCEDURE — G8427 DOCREV CUR MEDS BY ELIG CLIN: HCPCS | Performed by: PHYSICIAN ASSISTANT

## 2024-07-09 PROCEDURE — 4004F PT TOBACCO SCREEN RCVD TLK: CPT | Performed by: PHYSICIAN ASSISTANT

## 2024-07-09 PROCEDURE — 3017F COLORECTAL CA SCREEN DOC REV: CPT | Performed by: PHYSICIAN ASSISTANT

## 2024-07-09 RX ORDER — BUPIVACAINE HYDROCHLORIDE 5 MG/ML
4 INJECTION, SOLUTION PERINEURAL ONCE
Status: COMPLETED | OUTPATIENT
Start: 2024-07-09 | End: 2024-07-09

## 2024-07-09 RX ORDER — LIDOCAINE HYDROCHLORIDE 10 MG/ML
4 INJECTION, SOLUTION INFILTRATION; PERINEURAL ONCE
Status: COMPLETED | OUTPATIENT
Start: 2024-07-09 | End: 2024-07-09

## 2024-07-09 RX ORDER — METHYLPREDNISOLONE ACETATE 80 MG/ML
80 INJECTION, SUSPENSION INTRA-ARTICULAR; INTRALESIONAL; INTRAMUSCULAR; SOFT TISSUE ONCE
Status: COMPLETED | OUTPATIENT
Start: 2024-07-09 | End: 2024-07-09

## 2024-07-09 RX ADMIN — METHYLPREDNISOLONE ACETATE 80 MG: 80 INJECTION, SUSPENSION INTRA-ARTICULAR; INTRALESIONAL; INTRAMUSCULAR; SOFT TISSUE at 13:52

## 2024-07-09 RX ADMIN — IOHEXOL 2 ML: 240 INJECTION, SOLUTION INTRATHECAL; INTRAVASCULAR; INTRAVENOUS; ORAL at 13:58

## 2024-07-09 RX ADMIN — LIDOCAINE HYDROCHLORIDE 4 ML: 10 INJECTION, SOLUTION INFILTRATION; PERINEURAL at 13:52

## 2024-07-09 RX ADMIN — BUPIVACAINE HYDROCHLORIDE 20 MG: 5 INJECTION, SOLUTION EPIDURAL; INTRACAUDAL; PERINEURAL at 13:52

## 2024-07-09 NOTE — PROGRESS NOTES
Patient to IR for left hip injection.  JR TORRES and JUANITA/ZEENAT RTs at bedside.  Site prepped and draped, area numbed with lidocaine.  Ordered medications injected (see MAR).  Band aid placed to site.  Patient tolerated well and is [facility].

## 2024-07-09 NOTE — PROGRESS NOTES
Family History   Problem Relation Age of Onset    Diabetes Mother     COPD Mother         Physical Exam:  Vitals signs and nursing note reviewed.   Constitutional:       Appearance: well-developed.   HENT:      Head: Normocephalic and atraumatic.      Nose: Nose normal.   Eyes:      Conjunctiva/sclera: Conjunctivae normal.   Neck:      Musculoskeletal: Normal range of motion and neck supple.   Pulmonary:      Effort: Pulmonary effort is normal. No respiratory distress.   Neurological:      Mental Status: Alert and oriented to person, place, and time.      Sensory: No sensory deficit.   Psychiatric:         Behavior: Behavior normal.         Thought Content: Thought content normal.  Skin:     General: Skin is warm and dry.   Musculoskeletal:      Comments: Normal gait  Left hip exam demonstrates no groin pain with motion such as external rotation and well as abduction. This is significant improvement from her last office visit.     Diagnostic imaging:    No new imaging taken today however reviewed previous    Assessment and Plan:  1. Chronic bilateral low back pain without sciatica    2. Left hip pain    3. Smoker    Ghada is a 63-year-old female presenting for follow-up of left hip pain after IR injection.  Patient states that the the injection did substantially well her groin pain has now resolved.  I did discuss with her that she is got 2 complaints is that her low back has been bothering her as well as her left hip pain as this was the main pain generator.  Patient is happy with her pain improvement at this time.  I would like to see her back in 3 months to see how she is doing she is advised to give the office a call with any new or worsening symptoms at that point.  I did express to her that she will continue to have some ongoing back pain however from her last office visit to today her hip pain is improved with the IR injection.  We will see her in 3 months.        Follow up 3 months      Devi Douglas

## 2024-07-09 NOTE — BRIEF OP NOTE
Brief Postoperative Note for Hip Injection    Ghada Gillis  YOB: 1960  [id number]    Pre-operative Diagnosis:  Left Hip Pain     Post-operative Diagnosis: Same    Procedure: Fluoroscopy Guided Hip Injection     Anesthesia: 1% Lidocaine     Surgeons/Assistants: Hector Lewis PA-C    Complications: none    EBL: Minimal    Specimens: Were not obtained    Successful left hip steroid mixture injection.  Dressing applied.     Electronically signed by BRIAN Birmingham on 7/9/2024 at 2:13 PM

## 2024-08-21 NOTE — DISCHARGE SUMMARY
[x] [facility]  Outpatient Rehabilitation &  Therapy  [address]  [phone]  [phone] [] [facility]  Outpatient Rehabilitation &  Therapy  [address]  P: [phone]  F: [phone] [] [facility]  Outpatient Rehabilitation &  Therapy  [address]  P: [phone]  F: [phone] [] [facility]  Outpatient Rehabilitation &  Therapy  [address]  [phone]  [phone] [] [facility]  Outpatient Rehabilitation &  Therapy  [address]   P: [phone]  F: [phone]  [] [facility]  Outpatient Rehabilitation &  Therapy  [address]  P: [phone]  F: [phone] [] [facility]  Outpatient Rehabilitation &  Therapy  [address]  P: [phone]  F: [phone] [] [facility]  Outpatient Rehabilitation &  Therapy  [address]  P: [phone]  F: [phone] [] [facility]  Outpatient Rehabilitation &  Therapy  [address]  P: [phone]  F: [phone]  [] [facility] Outpatient Rehabilitation &  Therapy  [address]  P: [phone]  F: [phone]        Physical Therapy Discharge Note    Date: 2024      Patient: Ghada Gillis  : 1960  MRN: [id number]    Physician: BRIAN Mcduffie                  [facility]urance: [facility], [facility] after 12  Medical Diagnosis:   Left hip pain (M25.552) Chronic bilateral low back pain without sciatica (M54.50,G89.29)        Rehab Codes: M25.552, M54.5   Onset Date: 3/3/24                                 Next 's appt: 24  Visit# / total visits: ; Progress note for Medicare patient due at visit 10                                    Cancels/No Shows: 0  Date of initial visit: 24                Date of final visit:

## 2024-10-08 ENCOUNTER — OFFICE VISIT (OUTPATIENT)
Dept: ORTHOPEDIC SURGERY | Age: 64
End: 2024-10-08
Payer: COMMERCIAL

## 2024-10-08 VITALS — BODY MASS INDEX: 20.49 KG/M2 | WEIGHT: 120 LBS | HEIGHT: 64 IN | RESPIRATION RATE: 16 BRPM

## 2024-10-08 DIAGNOSIS — M54.50 CHRONIC BILATERAL LOW BACK PAIN WITHOUT SCIATICA: ICD-10-CM

## 2024-10-08 DIAGNOSIS — M54.42 CHRONIC BILATERAL LOW BACK PAIN WITH BILATERAL SCIATICA: Primary | ICD-10-CM

## 2024-10-08 DIAGNOSIS — G89.29 CHRONIC BILATERAL LOW BACK PAIN WITHOUT SCIATICA: ICD-10-CM

## 2024-10-08 DIAGNOSIS — M54.41 CHRONIC BILATERAL LOW BACK PAIN WITH BILATERAL SCIATICA: Primary | ICD-10-CM

## 2024-10-08 DIAGNOSIS — G89.29 CHRONIC BILATERAL LOW BACK PAIN WITH BILATERAL SCIATICA: Primary | ICD-10-CM

## 2024-10-08 PROCEDURE — 99213 OFFICE O/P EST LOW 20 MIN: CPT | Performed by: PHYSICIAN ASSISTANT

## 2024-10-08 PROCEDURE — G8428 CUR MEDS NOT DOCUMENT: HCPCS | Performed by: PHYSICIAN ASSISTANT

## 2024-10-08 PROCEDURE — 4004F PT TOBACCO SCREEN RCVD TLK: CPT | Performed by: PHYSICIAN ASSISTANT

## 2024-10-08 PROCEDURE — 3017F COLORECTAL CA SCREEN DOC REV: CPT | Performed by: PHYSICIAN ASSISTANT

## 2024-10-08 PROCEDURE — G8484 FLU IMMUNIZE NO ADMIN: HCPCS | Performed by: PHYSICIAN ASSISTANT

## 2024-10-08 PROCEDURE — G8420 CALC BMI NORM PARAMETERS: HCPCS | Performed by: PHYSICIAN ASSISTANT

## 2024-10-08 NOTE — PROGRESS NOTES
Mental Status: Alert and oriented to person, place, and time.      Sensory: No sensory deficit.   Psychiatric:         Behavior: Behavior normal.         Thought Content: Thought content normal.  Skin:     General: Skin is warm and dry.   Musculoskeletal:      Comments: Normal gait  No pain with range of motion of her hips bilaterally not able to elicit any tenderness along her spine or paraspinal musculature    Diagnostic imaging:  No new imaging taken today.      Assessment and Plan:  1. Chronic bilateral low back pain with bilateral sciatica    2. Chronic bilateral low back pain without sciatica      63-year-old female presenting for 3-month follow-up of chronic low back pain now developing sciatica-like symptoms with numbness and tingling as well.  Patient reports that her hip pain has resolved with IR injection for her left hip.  At this time I do recommend that we get her MRI of her lumbar spine for further delineation of diagnosis she has completed physical therapy in the past that has helped however the exercises that she has been doing at home do not seem to provide any relief.  Patient is going through a GI workup and possible gallbladder pathology and advised that she follow-up with us when she is ready to discuss the MRI of her lumbar spine when she gets it.  Will see her back after the MRI all other questions were answered today      Follow up after MRI            Devi Douglas PA-C    10/8/2024 10:15 AM        Please note that this chart was generated using voice recognition Dragon dictation software.  Although every effort was made to ensure the accuracy of this automated transcription, some errors in transcription may have occurred.

## 2024-12-03 ENCOUNTER — OFFICE VISIT (OUTPATIENT)
Dept: ORTHOPEDIC SURGERY | Age: 64
End: 2024-12-03
Payer: COMMERCIAL

## 2024-12-03 VITALS — WEIGHT: 120 LBS | BODY MASS INDEX: 20.49 KG/M2 | RESPIRATION RATE: 14 BRPM | HEIGHT: 64 IN

## 2024-12-03 DIAGNOSIS — G89.29 CHRONIC BILATERAL LOW BACK PAIN WITHOUT SCIATICA: ICD-10-CM

## 2024-12-03 DIAGNOSIS — M54.42 CHRONIC BILATERAL LOW BACK PAIN WITH BILATERAL SCIATICA: Primary | ICD-10-CM

## 2024-12-03 DIAGNOSIS — M54.41 CHRONIC BILATERAL LOW BACK PAIN WITH BILATERAL SCIATICA: Primary | ICD-10-CM

## 2024-12-03 DIAGNOSIS — G89.29 CHRONIC BILATERAL LOW BACK PAIN WITH BILATERAL SCIATICA: Primary | ICD-10-CM

## 2024-12-03 DIAGNOSIS — M54.50 CHRONIC BILATERAL LOW BACK PAIN WITHOUT SCIATICA: ICD-10-CM

## 2024-12-03 PROCEDURE — 3017F COLORECTAL CA SCREEN DOC REV: CPT | Performed by: PHYSICIAN ASSISTANT

## 2024-12-03 PROCEDURE — G8428 CUR MEDS NOT DOCUMENT: HCPCS | Performed by: PHYSICIAN ASSISTANT

## 2024-12-03 PROCEDURE — G8420 CALC BMI NORM PARAMETERS: HCPCS | Performed by: PHYSICIAN ASSISTANT

## 2024-12-03 PROCEDURE — 99213 OFFICE O/P EST LOW 20 MIN: CPT | Performed by: PHYSICIAN ASSISTANT

## 2024-12-03 PROCEDURE — 4004F PT TOBACCO SCREEN RCVD TLK: CPT | Performed by: PHYSICIAN ASSISTANT

## 2024-12-03 PROCEDURE — G8484 FLU IMMUNIZE NO ADMIN: HCPCS | Performed by: PHYSICIAN ASSISTANT

## 2024-12-03 NOTE — PROGRESS NOTES
Patient ID: Ghada Gillis is a 64 y.o. female    Chief Compliant:  Chief Complaint   Patient presents with    Back Problem      HPI:  This is a 64 y.o. female who presents to the clinic today for follow up of chronic low back pain with left hip pain.  Patient here for follow-up of MRI results of her lumbar spine.  Most of her left hip pain is relatively resolved as she had an IR hip injection on 7/9/2024.  Mainly complains of low back pain with numbness and tingling to lower extremities. Completed physical therapy without much improvement.  MRI of lumbar spine relatively unremarkable.  She does have L5-S1 some very mild foraminal narrowing.  Report does note some abnormal signaling to the iliac bone bilaterally and recommending pelvic MRI.  No other changes in symptoms since last seen in the office with me.  Denies any recent fall trauma or injury, bowel or bladder concern, saddle anesthesia.      Review of Systems   All other systems reviewed and are negative.    Past History:    Current Outpatient Medications:     alendronate (FOSAMAX) 70 MG tablet, , Disp: , Rfl:     amitriptyline (ELAVIL) 10 MG tablet, Take 1 tablet by mouth nightly, Disp: , Rfl:     atorvastatin (LIPITOR) 20 MG tablet, , Disp: , Rfl:     busPIRone (BUSPAR) 7.5 MG tablet, TAKE 2 TABLETS BY MOUTH EVERY MORNING AND AT BEDTIME, Disp: , Rfl:     diclofenac sodium (VOLTAREN) 1 % GEL, Apply 2 g topically 4 times daily, Disp: , Rfl:     pantoprazole (PROTONIX) 40 MG tablet, , Disp: , Rfl:     INCRUSE ELLIPTA 62.5 MCG/ACT inhaler, INHALE ONE PUFF BY MOUTH EVERY MORNING, Disp: , Rfl:     ondansetron (ZOFRAN ODT) 4 MG disintegrating tablet, Take 1 tablet by mouth every 8 hours as needed for Nausea, Disp: 20 tablet, Rfl: 0    tiotropium (SPIRIVA) 18 MCG inhalation capsule, Inhale 18 mcg into the lungs daily., Disp: , Rfl:     albuterol (PROVENTIL) (5 MG/ML) 0.5% nebulizer solution, Take 1 mL by nebulization every 6 hours as needed for Wheezing., Disp:

## 2024-12-20 ENCOUNTER — HOSPITAL ENCOUNTER (OUTPATIENT)
Dept: MRI IMAGING | Age: 64
Discharge: HOME | End: 2024-12-22
Payer: COMMERCIAL

## 2024-12-20 DIAGNOSIS — M54.41 CHRONIC BILATERAL LOW BACK PAIN WITH BILATERAL SCIATICA: ICD-10-CM

## 2024-12-20 DIAGNOSIS — M54.42 CHRONIC BILATERAL LOW BACK PAIN WITH BILATERAL SCIATICA: ICD-10-CM

## 2024-12-20 DIAGNOSIS — G89.29 CHRONIC BILATERAL LOW BACK PAIN WITH BILATERAL SCIATICA: ICD-10-CM

## 2024-12-20 PROCEDURE — 72195 MRI PELVIS W/O DYE: CPT

## 2025-02-19 ENCOUNTER — HOSPITAL ENCOUNTER (OUTPATIENT)
Dept: PAIN MANAGEMENT | Age: 65
Discharge: HOME | End: 2025-02-19
Payer: COMMERCIAL

## 2025-02-19 VITALS — BODY MASS INDEX: 20.49 KG/M2 | WEIGHT: 120 LBS | HEIGHT: 64 IN

## 2025-02-19 DIAGNOSIS — M51.369 DEGENERATION OF INTERVERTEBRAL DISC OF LUMBAR REGION, UNSPECIFIED WHETHER PAIN PRESENT: ICD-10-CM

## 2025-02-19 DIAGNOSIS — M54.81 OCCIPITAL NEURALGIA OF LEFT SIDE: ICD-10-CM

## 2025-02-19 DIAGNOSIS — M47.817 LUMBOSACRAL SPONDYLOSIS WITHOUT MYELOPATHY: Primary | ICD-10-CM

## 2025-02-19 PROCEDURE — 99204 OFFICE O/P NEW MOD 45 MIN: CPT | Performed by: STUDENT IN AN ORGANIZED HEALTH CARE EDUCATION/TRAINING PROGRAM

## 2025-02-19 PROCEDURE — 99203 OFFICE O/P NEW LOW 30 MIN: CPT

## 2025-02-19 PROCEDURE — 64405 NJX AA&/STRD GR OCPL NRV: CPT

## 2025-02-19 PROCEDURE — 64450 NJX AA&/STRD OTHER PN/BRANCH: CPT | Performed by: STUDENT IN AN ORGANIZED HEALTH CARE EDUCATION/TRAINING PROGRAM

## 2025-02-19 PROCEDURE — 64450 NJX AA&/STRD OTHER PN/BRANCH: CPT

## 2025-02-19 PROCEDURE — 6360000002 HC RX W HCPCS: Performed by: STUDENT IN AN ORGANIZED HEALTH CARE EDUCATION/TRAINING PROGRAM

## 2025-02-19 PROCEDURE — 64405 NJX AA&/STRD GR OCPL NRV: CPT | Performed by: STUDENT IN AN ORGANIZED HEALTH CARE EDUCATION/TRAINING PROGRAM

## 2025-02-19 RX ORDER — TRIAMCINOLONE ACETONIDE 40 MG/ML
40 INJECTION, SUSPENSION INTRA-ARTICULAR; INTRAMUSCULAR ONCE
Status: COMPLETED | OUTPATIENT
Start: 2025-02-19 | End: 2025-02-19

## 2025-02-19 RX ORDER — BUPIVACAINE HYDROCHLORIDE 2.5 MG/ML
5 INJECTION, SOLUTION EPIDURAL; INFILTRATION; INTRACAUDAL
Status: COMPLETED | OUTPATIENT
Start: 2025-02-19 | End: 2025-02-19

## 2025-02-19 RX ADMIN — TRIAMCINOLONE ACETONIDE 40 MG: 40 INJECTION, SUSPENSION INTRA-ARTICULAR; INTRAMUSCULAR at 11:59

## 2025-02-19 RX ADMIN — BUPIVACAINE HYDROCHLORIDE 5 ML: 2.5 INJECTION, SOLUTION EPIDURAL; INFILTRATION; INTRACAUDAL; PERINEURAL at 11:59

## 2025-02-19 NOTE — PROGRESS NOTES
Chronic Pain Clinic Note     Encounter Date: 2/19/2025     SUBJECTIVE:  Chief Complaint   Patient presents with    New Patient     Back pain       History of Present Illness:   Ghada Gillis is a 64 y.o. female who presents with back pain    Medication Refill: n/a    Current Complaints of Pain:   Location: low back    Radiation: None  Severity: Moderate  Pain Numerical Score - 5   Average: 4     Highest: 6  Lowest: 3  Character/Quality: Complains of pain that is aching  Timing: Intermittent  Associated symptoms: none  Numbness: No  Weakness: no  Exacerbating factors: sitting,walking, standing  Alleviating factors: rest,heat   Length of time pain has been present: Started about 2 years ago   Inciting event/injury: no  Bowel/Bladder incontinence: no  Falls: no  Physical Therapy: yes    History of Interventions:   Surgery: No previous lumbar/cervical surgeries  Injections: no    Imaging:    MRI Lumbar spine 11/05/24    Past Medical History:   Diagnosis Date    COPD (chronic obstructive pulmonary disease) ([facility])     Thyrotoxicosis 1/10/2018       Past Surgical History:   Procedure Laterality Date    TUBAL LIGATION         Family History   Problem Relation Age of Onset    Diabetes Mother     COPD Mother        Social History     Socioeconomic History    Marital status: Single     Spouse name: Not on file    Number of children: Not on file    Years of education: Not on file    Highest education level: Not on file   Occupational History    Not on file   Tobacco Use    Smoking status: Every Day     Current packs/day: 0.50     Average packs/day: 0.5 packs/day for 30.0 years (15.0 ttl pk-yrs)     Types: Cigarettes    Smokeless tobacco: Never   Substance and Sexual Activity    Alcohol use: Yes     Comment: social    Drug use: Yes     Types: Marijuana (Weed)    Sexual activity: Yes   Other Topics Concern    Not on file   Social History Narrative    Not on file     Social Determinants of Health     Financial Resource Strain:

## 2025-02-19 NOTE — H&P (VIEW-ONLY)
Chronic Pain Clinic Note     Encounter Date: 2/19/2025     SUBJECTIVE:  Chief Complaint   Patient presents with    New Patient     Back pain       History of Present Illness:   Ghada Gillis is a 64 y.o. female who presents with back pain    Medication Refill: n/a    Current Complaints of Pain:   Location: low back    Radiation: None  Severity: Moderate  Pain Numerical Score - 5   Average: 4     Highest: 6  Lowest: 3  Character/Quality: Complains of pain that is aching  Timing: Intermittent  Associated symptoms: none  Numbness: No  Weakness: no  Exacerbating factors: sitting,walking, standing  Alleviating factors: rest,heat   Length of time pain has been present: Started about 2 years ago   Inciting event/injury: no  Bowel/Bladder incontinence: no  Falls: no  Physical Therapy: yes    History of Interventions:   Surgery: No previous lumbar/cervical surgeries  Injections: no    Imaging:    MRI Lumbar spine 11/05/24    Past Medical History:   Diagnosis Date    COPD (chronic obstructive pulmonary disease) ([facility])     Thyrotoxicosis 1/10/2018       Past Surgical History:   Procedure Laterality Date    TUBAL LIGATION         Family History   Problem Relation Age of Onset    Diabetes Mother     COPD Mother        Social History     Socioeconomic History    Marital status: Single     Spouse name: Not on file    Number of children: Not on file    Years of education: Not on file    Highest education level: Not on file   Occupational History    Not on file   Tobacco Use    Smoking status: Every Day     Current packs/day: 0.50     Average packs/day: 0.5 packs/day for 30.0 years (15.0 ttl pk-yrs)     Types: Cigarettes    Smokeless tobacco: Never   Substance and Sexual Activity    Alcohol use: Yes     Comment: social    Drug use: Yes     Types: Marijuana (Weed)    Sexual activity: Yes   Other Topics Concern    Not on file   Social History Narrative    Not on file     Social Determinants of Health     Financial Resource Strain:

## 2025-03-11 ENCOUNTER — HOSPITAL ENCOUNTER (OUTPATIENT)
Dept: PAIN MANAGEMENT | Facility: CLINIC | Age: 65
Discharge: HOME | End: 2025-03-11
Payer: COMMERCIAL

## 2025-03-11 VITALS
SYSTOLIC BLOOD PRESSURE: 163 MMHG | HEIGHT: 64 IN | DIASTOLIC BLOOD PRESSURE: 91 MMHG | WEIGHT: 122 LBS | HEART RATE: 60 BPM | TEMPERATURE: 97 F | OXYGEN SATURATION: 97 % | BODY MASS INDEX: 20.83 KG/M2 | RESPIRATION RATE: 16 BRPM

## 2025-03-11 DIAGNOSIS — R52 PAIN MANAGEMENT: ICD-10-CM

## 2025-03-11 PROCEDURE — 6360000002 HC RX W HCPCS: Performed by: STUDENT IN AN ORGANIZED HEALTH CARE EDUCATION/TRAINING PROGRAM

## 2025-03-11 PROCEDURE — 64494 INJ PARAVERT F JNT L/S 2 LEV: CPT | Performed by: STUDENT IN AN ORGANIZED HEALTH CARE EDUCATION/TRAINING PROGRAM

## 2025-03-11 PROCEDURE — 64494 INJ PARAVERT F JNT L/S 2 LEV: CPT

## 2025-03-11 PROCEDURE — 64493 INJ PARAVERT F JNT L/S 1 LEV: CPT

## 2025-03-11 PROCEDURE — 64493 INJ PARAVERT F JNT L/S 1 LEV: CPT | Performed by: STUDENT IN AN ORGANIZED HEALTH CARE EDUCATION/TRAINING PROGRAM

## 2025-03-11 RX ORDER — LIDOCAINE HYDROCHLORIDE 20 MG/ML
INJECTION, SOLUTION EPIDURAL; INFILTRATION; INTRACAUDAL; PERINEURAL
Status: COMPLETED | OUTPATIENT
Start: 2025-03-11 | End: 2025-03-11

## 2025-03-11 RX ADMIN — LIDOCAINE HYDROCHLORIDE 5 ML: 20 INJECTION, SOLUTION EPIDURAL; INFILTRATION; INTRACAUDAL; PERINEURAL at 13:19

## 2025-03-11 ASSESSMENT — PAIN DESCRIPTION - DESCRIPTORS: DESCRIPTORS: SHOOTING;SHARP

## 2025-03-11 ASSESSMENT — PAIN - FUNCTIONAL ASSESSMENT
PAIN_FUNCTIONAL_ASSESSMENT: 0-10
PAIN_FUNCTIONAL_ASSESSMENT: PREVENTS OR INTERFERES SOME ACTIVE ACTIVITIES AND ADLS

## 2025-03-11 NOTE — INTERVAL H&P NOTE
Update History & Physical    The patient's History and Physical of February 19, 2025 was reviewed with the patient and I examined the patient. There was no change. The surgical site was confirmed by the patient and me.     Plan: The risks, benefits, expected outcome, and alternative to the recommended procedure have been discussed with the patient. Patient understands and wants to proceed with the procedure.     ASA CLASSIFICATION  2  MP   CLASSIFICATION  2    Electronically signed by Ryan Forte DO on 3/11/2025 at 1:09 PM

## 2025-03-11 NOTE — OP NOTE
PROCEDURE PERFORMED: Bilateral Lumbar medial branch block    PREOPERATIVE DIAGNOSIS: Lumbosacral spondylosis    INDICATIONS: Chronic low back pain    The patient's history and physical exam were reviewed.  The risk, benefits, and alternatives of the procedure were discussed and all questions were answered to the patient's satisfaction.  The patient agreed to proceed and written informed consent was obtained.    POSTOPERATIVE DIAGNOSIS: Lumbar spondylosis    PHYSICIAN:  Dr. Ryan Forte DO    ANESTHESIA:  LOCAL    ASSISTANT:  NONE    PATHOLOGY:  NONE    ESTIMATED BLOOD LOSS:  N/A    IMPLANTS:  NONE    PROCEDURE DESCRIPTION: Diagnostic bilateral lumbar medial branch block using fluoroscopy    The patient was placed on the operative bed in prone position.  The area was prepped with  Chlorhexidine.  The area was then draped in a sterile fashion.    Targeted levels: Bilateral lumbar L3, L4, L5 medial branch block    An AP  fluoroscopy image was used to identify and dakota Danielson's point at the L3, L4 levels on the targeted side.  Additionally, the junction of the SAP and sacral ala was also marked on the same side.   A 25-gauge 3-1/2 inch Quincke spinal needle was then advanced toward each of these points under fluoroscopic guidance.  Once bone was contacted, negative aspiration was confirmed and 0.5 mL of lidocaine 2% was injected each level.  The needles were removed and the needle sites were dressed appropriately. The same procedure was performed on the opposite side.    The patient was transferred to the postoperative care unit in stable condition.  Written discharge instructions were given to the patient.  The patient was given a pain diary upon discharge.    COMPLICATIONS:  There were no apparent complications.  The patient tolerated the procedure well.

## 2025-03-11 NOTE — DISCHARGE INSTRUCTIONS
You have received a sedative/anesthetic therefore you should not consume any alcoholic beverages for 24 hours.  Do not drive or operate machinery for 24 hours.    Do not take a tub bath for 72 hours after procedure (this includes hot tubs).  You may shower, but avoid hot water to injection site.   Avoid strenuous activity TODAY especially if you experience dizziness.   Remove band-aid the next day.    Wash off any residual iodine 24 hours from today.   Do not use heat, heating pad, or any other heating device over the injection site for 3 days after the procedure.    If you experience pain after your procedure, you may continue with your current pain medication as prescribed.  (DO NOT INCREASE YOUR PAIN MEDICATION WITHOUT TALKING TO DOCTOR)  Soreness and pain at injection site is common, may use ice to reduce soreness.    Please complete pain diary as instructed. Office staff will contact you to review results.    Call [facility] at [phone] if you experience:   Fever, chills or temperature over 100    Vomiting, headache, persistent stiff neck, nausea or blurred vision   Difficulty urinating or unable to urinate within 8 hours   Increase in weakness, numbness or loss of function of limbs  Increased redness, swelling or drainage at the injection site      
no

## 2025-03-12 ENCOUNTER — TELEPHONE (OUTPATIENT)
Dept: PAIN MANAGEMENT | Age: 65
End: 2025-03-12

## 2025-03-12 NOTE — TELEPHONE ENCOUNTER
S/P: Bilateral lumbar L3, L4, L5 medial branch block     DOS:  03/11/2025    Pain  before procedure with activity : 7/8    Pain after procedure with activity: 0    Activities following procedure include: shopping & yard work    % of pain relief: 100%    Procedure successful: yes    OV or OR scheduled: OR

## 2025-03-25 ENCOUNTER — HOSPITAL ENCOUNTER (OUTPATIENT)
Dept: PAIN MANAGEMENT | Facility: CLINIC | Age: 65
Discharge: HOME | End: 2025-03-25
Payer: COMMERCIAL

## 2025-03-25 VITALS
OXYGEN SATURATION: 96 % | SYSTOLIC BLOOD PRESSURE: 159 MMHG | WEIGHT: 122 LBS | DIASTOLIC BLOOD PRESSURE: 81 MMHG | RESPIRATION RATE: 12 BRPM | HEIGHT: 64 IN | HEART RATE: 69 BPM | BODY MASS INDEX: 20.83 KG/M2 | TEMPERATURE: 97.8 F

## 2025-03-25 DIAGNOSIS — R52 PAIN MANAGEMENT: ICD-10-CM

## 2025-03-25 PROCEDURE — 64494 INJ PARAVERT F JNT L/S 2 LEV: CPT

## 2025-03-25 PROCEDURE — 64493 INJ PARAVERT F JNT L/S 1 LEV: CPT

## 2025-03-25 PROCEDURE — 64493 INJ PARAVERT F JNT L/S 1 LEV: CPT | Performed by: STUDENT IN AN ORGANIZED HEALTH CARE EDUCATION/TRAINING PROGRAM

## 2025-03-25 PROCEDURE — 6360000002 HC RX W HCPCS: Performed by: STUDENT IN AN ORGANIZED HEALTH CARE EDUCATION/TRAINING PROGRAM

## 2025-03-25 PROCEDURE — 64494 INJ PARAVERT F JNT L/S 2 LEV: CPT | Performed by: STUDENT IN AN ORGANIZED HEALTH CARE EDUCATION/TRAINING PROGRAM

## 2025-03-25 RX ORDER — LIDOCAINE HYDROCHLORIDE 20 MG/ML
INJECTION, SOLUTION EPIDURAL; INFILTRATION; INTRACAUDAL; PERINEURAL
Status: COMPLETED | OUTPATIENT
Start: 2025-03-25 | End: 2025-03-25

## 2025-03-25 RX ORDER — FENOFIBRATE 160 MG/1
160 TABLET ORAL DAILY
COMMUNITY

## 2025-03-25 RX ORDER — CHOLECALCIFEROL (VITAMIN D3) 1250 MCG
50000 CAPSULE ORAL
COMMUNITY

## 2025-03-25 RX ADMIN — LIDOCAINE HYDROCHLORIDE 5 ML: 20 INJECTION, SOLUTION EPIDURAL; INFILTRATION; INTRACAUDAL; PERINEURAL at 14:58

## 2025-03-25 ASSESSMENT — PAIN DESCRIPTION - LOCATION: LOCATION: BACK

## 2025-03-25 ASSESSMENT — PAIN DESCRIPTION - DESCRIPTORS: DESCRIPTORS: BURNING

## 2025-03-25 ASSESSMENT — PAIN DESCRIPTION - FREQUENCY: FREQUENCY: INTERMITTENT

## 2025-03-25 ASSESSMENT — PAIN DESCRIPTION - DIRECTION: RADIATING_TOWARDS: BILAT HIPS AND LEGS

## 2025-03-25 ASSESSMENT — PAIN DESCRIPTION - ORIENTATION: ORIENTATION: LOWER

## 2025-03-25 ASSESSMENT — PAIN DESCRIPTION - PAIN TYPE: TYPE: CHRONIC PAIN

## 2025-03-25 ASSESSMENT — PAIN - FUNCTIONAL ASSESSMENT: PAIN_FUNCTIONAL_ASSESSMENT: PREVENTS OR INTERFERES SOME ACTIVE ACTIVITIES AND ADLS

## 2025-03-25 ASSESSMENT — PAIN SCALES - GENERAL: PAINLEVEL_OUTOF10: 7

## 2025-03-25 ASSESSMENT — PAIN DESCRIPTION - ONSET: ONSET: PROGRESSIVE

## 2025-03-25 NOTE — H&P
nicotine (NICODERM CQ) 14 MG/24HR 1 patch, TransDERmal, EVERY 24 HOURS    ondansetron (ZOFRAN ODT) 4 mg, Oral, EVERY 8 HOURS PRN    pantoprazole (PROTONIX) 40 MG tablet     tiotropium (SPIRIVA HANDIHALER) 18 mcg, Inhalation, DAILY    tiotropium (SPIRIVA) 18 mcg, DAILY       Allergies   Allergen Reactions    Morphine Anaphylaxis and Itching    Varenicline Anaphylaxis    Nicotine Other (See Comments)     Reaction-Localized arm pain       Review of Systems:   Focused review of systems was performed, and negative as pertinent to diagnosis, except as stated in HPI.      Physical Exam  Constitutional:       Appearance: Normal appearance.   Pulmonary:      Effort: Pulmonary effort is normal.   Neurological:      Mental Status: alert.   Psychiatric:         Attention and Perception: Attention and perception normal.         Mood and Affect: Mood and affect normal.   Cardiovascular:      Rate: Normal rate.       Patient's current physical status, medications, medical history, and HPI have been reviewed and updated as appropriate on this date: 03/25/25    Risk/Benefit(s): The risks, benefits, alternatives, and potential complications have been discussed with the patient/family and informed consent has been obtained for the procedure/sedation.    Diagnosis:   Lumbosacral spondylosis      Plan: Bilateral lumbar L3, L4, L5 MBB      ASA CLASSIFICATION  2  MP   CLASSIFICATION  2    Ryan Forte DO

## 2025-03-25 NOTE — DISCHARGE INSTRUCTIONS
You have received a sedative/anesthetic therefore you should not consume any alcoholic beverages for 24 hours.  Do not drive or operate machinery for 24 hours.    Do not take a tub bath for 72 hours after procedure (this includes hot tubs).  You may shower, but avoid hot water to injection site.   Avoid strenuous activity TODAY especially if you experience dizziness.   Remove band-aid the next day.    Wash off any residual iodine 24 hours from today.   Do not use heat, heating pad, or any other heating device over the injection site for 3 days after the procedure.    If you experience pain after your procedure, you may continue with your current pain medication as prescribed.  (DO NOT INCREASE YOUR PAIN MEDICATION WITHOUT TALKING TO DOCTOR)  Soreness and pain at injection site is common, may use ice to reduce soreness.    Please complete pain diary as instructed. Office staff will contact you to review results.    Call [facility] at [phone] if you experience:   Fever, chills or temperature over 100    Vomiting, headache, persistent stiff neck, nausea or blurred vision   Difficulty urinating or unable to urinate within 8 hours   Increase in weakness, numbness or loss of function of limbs  Increased redness, swelling or drainage at the injection site

## 2025-04-08 ENCOUNTER — TELEPHONE (OUTPATIENT)
Dept: PAIN MANAGEMENT | Age: 65
End: 2025-04-08

## 2025-04-08 NOTE — TELEPHONE ENCOUNTER
S/P: Bilateral lumbar L3, L4, L5 medial branch block     DOS: 03/25/2025    Pain  before procedure with activity : 0    Pain after procedure with activity: 0    Activities following procedure include: shopping, laundry     % of pain relief: 90%    Procedure successful: Yes    OV or OR scheduled: OR

## 2025-04-29 ENCOUNTER — HOSPITAL ENCOUNTER (OUTPATIENT)
Dept: PAIN MANAGEMENT | Facility: CLINIC | Age: 65
Discharge: HOME | End: 2025-04-29
Payer: COMMERCIAL

## 2025-04-29 VITALS
TEMPERATURE: 97.8 F | SYSTOLIC BLOOD PRESSURE: 144 MMHG | WEIGHT: 122 LBS | RESPIRATION RATE: 15 BRPM | HEIGHT: 64 IN | DIASTOLIC BLOOD PRESSURE: 67 MMHG | BODY MASS INDEX: 20.83 KG/M2 | HEART RATE: 64 BPM | OXYGEN SATURATION: 96 %

## 2025-04-29 DIAGNOSIS — R52 PAIN MANAGEMENT: ICD-10-CM

## 2025-04-29 PROCEDURE — 64636 DESTROY L/S FACET JNT ADDL: CPT | Performed by: STUDENT IN AN ORGANIZED HEALTH CARE EDUCATION/TRAINING PROGRAM

## 2025-04-29 PROCEDURE — 64636 DESTROY L/S FACET JNT ADDL: CPT

## 2025-04-29 PROCEDURE — 64635 DESTROY LUMB/SAC FACET JNT: CPT

## 2025-04-29 PROCEDURE — 64635 DESTROY LUMB/SAC FACET JNT: CPT | Performed by: STUDENT IN AN ORGANIZED HEALTH CARE EDUCATION/TRAINING PROGRAM

## 2025-04-29 PROCEDURE — 6360000002 HC RX W HCPCS: Performed by: STUDENT IN AN ORGANIZED HEALTH CARE EDUCATION/TRAINING PROGRAM

## 2025-04-29 RX ORDER — LIDOCAINE HYDROCHLORIDE 10 MG/ML
INJECTION, SOLUTION EPIDURAL; INFILTRATION; INTRACAUDAL; PERINEURAL
Status: COMPLETED | OUTPATIENT
Start: 2025-04-29 | End: 2025-04-29

## 2025-04-29 RX ORDER — LIDOCAINE HYDROCHLORIDE 20 MG/ML
INJECTION, SOLUTION EPIDURAL; INFILTRATION; INTRACAUDAL; PERINEURAL
Status: COMPLETED | OUTPATIENT
Start: 2025-04-29 | End: 2025-04-29

## 2025-04-29 RX ORDER — TRIAMCINOLONE ACETONIDE 40 MG/ML
INJECTION, SUSPENSION INTRA-ARTICULAR; INTRAMUSCULAR
Status: COMPLETED | OUTPATIENT
Start: 2025-04-29 | End: 2025-04-29

## 2025-04-29 RX ADMIN — LIDOCAINE HYDROCHLORIDE 2 ML: 10 INJECTION, SOLUTION EPIDURAL; INFILTRATION; INTRACAUDAL; PERINEURAL at 11:35

## 2025-04-29 RX ADMIN — TRIAMCINOLONE ACETONIDE 40 MG: 40 INJECTION, SUSPENSION INTRA-ARTICULAR; INTRAMUSCULAR at 11:34

## 2025-04-29 RX ADMIN — LIDOCAINE HYDROCHLORIDE 5 ML: 20 INJECTION, SOLUTION EPIDURAL; INFILTRATION; INTRACAUDAL; PERINEURAL at 11:31

## 2025-04-29 ASSESSMENT — PAIN DESCRIPTION - DESCRIPTORS: DESCRIPTORS: ACHING

## 2025-04-29 ASSESSMENT — PAIN - FUNCTIONAL ASSESSMENT
PAIN_FUNCTIONAL_ASSESSMENT: 0-10
PAIN_FUNCTIONAL_ASSESSMENT: NONE - DENIES PAIN

## 2025-04-29 NOTE — DISCHARGE INSTRUCTIONS

## 2025-04-29 NOTE — OP NOTE
PROCEDURE PERFORMED: Right Lumbar Medial Branch Radiofrequency Ablation using Fluoroscopy    PREOPERATIVE DIAGNOSIS: Lumbosacral spondylosis    INDICATIONS: Chronic low back pain    The patient's history and physical exam were reviewed.  The risk, benefits, and alternatives of the procedure were discussed and all questions were answered to the patient's satisfaction.  The patient agreed to proceed and written informed consent was obtained.    POSTOPERATIVE DIAGNOSIS: Lumbosacral spondylosis    PHYSICIAN:  Dr. Ryan Forte DO    ANESTHESIA:  LOCAL    ASSISTANT:  NONE    PATHOLOGY:  NONE    ESTIMATED BLOOD LOSS:  N/A    IMPLANTS:  NONE    PROCEDURE DESCRIPTION: Right lumbar medial branch radiofrequency ablation using fluoroscopy    The patient was placed on the operative bed in prone position.  The area was prepped with  Chlorhexidine.  The area was then draped in a sterile fashion.    Targeted levels: Right lumbar L3, L4, L5 medial branch radiofrequency ablation    An AP  fluoroscopy image was used to identify and dakota Danielson's point at the L3, L4 levels on the targeted side.  Additionally, the junction of the SAP and sacral ala was also marked on the same side.  The skin and subcutaneous tissues were then anesthetized with 1% lidocaine. At each lumbar medial branch, a 20-gauge, 100 mm curved with 10 mm active tip probe was advanced under AP fluoroscopic projections until bone was contacted along the medial aspect of the transverse process.  Aspiration of each needle was negative for blood, CSF and paresthesia prior to injection.   Motor stimulation at 2 Hz and 1.2 V was negative.  Then, after negative aspiration, 1 mL lidocaine 2% was injected at each level prior to lesioning which was performed at 80°C for 90 seconds.  Once the lesion was complete, injectate solution containing Kenalog 40 mg and 2 mL lidocaine 1% was injected at each site with a total of 1 mL.  The probes were then removed.  The needle

## 2025-04-29 NOTE — H&P
(SPIRIVA HANDIHALER) 18 mcg, Inhalation, DAILY    Vitamin D3 50,000 Units       Allergies   Allergen Reactions    Morphine Anaphylaxis and Itching    Varenicline Anaphylaxis    Nicotine Other (See Comments)     Reaction-Localized arm pain       Review of Systems:   Focused review of systems was performed, and negative as pertinent to diagnosis, except as stated in HPI.      Physical Exam  Constitutional:       Appearance: Normal appearance.   Pulmonary:      Effort: Pulmonary effort is normal.   Neurological:      Mental Status: alert.   Psychiatric:         Attention and Perception: Attention and perception normal.         Mood and Affect: Mood and affect normal.   Cardiovascular:      Rate: Normal rate.       Patient's current physical status, medications, medical history, and HPI have been reviewed and updated as appropriate on this date: 04/29/25    Risk/Benefit(s): The risks, benefits, alternatives, and potential complications have been discussed with the patient/family and informed consent has been obtained for the procedure/sedation.    Diagnosis:   Lumbosacral spondylosis      Plan: Right lumbar L3, L4, L5 RFA      ASA CLASSIFICATION  2  MP   CLASSIFICATION  2    Ryan Forte DO

## 2025-05-13 ENCOUNTER — HOSPITAL ENCOUNTER (OUTPATIENT)
Dept: PAIN MANAGEMENT | Facility: CLINIC | Age: 65
Discharge: HOME | End: 2025-05-13
Payer: COMMERCIAL

## 2025-05-13 VITALS
RESPIRATION RATE: 12 BRPM | OXYGEN SATURATION: 98 % | TEMPERATURE: 97.7 F | BODY MASS INDEX: 20.83 KG/M2 | DIASTOLIC BLOOD PRESSURE: 69 MMHG | HEIGHT: 64 IN | WEIGHT: 122 LBS | SYSTOLIC BLOOD PRESSURE: 157 MMHG | HEART RATE: 59 BPM

## 2025-05-13 DIAGNOSIS — R52 PAIN MANAGEMENT: ICD-10-CM

## 2025-05-13 PROCEDURE — 64636 DESTROY L/S FACET JNT ADDL: CPT | Performed by: STUDENT IN AN ORGANIZED HEALTH CARE EDUCATION/TRAINING PROGRAM

## 2025-05-13 PROCEDURE — 6360000002 HC RX W HCPCS: Performed by: STUDENT IN AN ORGANIZED HEALTH CARE EDUCATION/TRAINING PROGRAM

## 2025-05-13 PROCEDURE — 64635 DESTROY LUMB/SAC FACET JNT: CPT

## 2025-05-13 PROCEDURE — 64635 DESTROY LUMB/SAC FACET JNT: CPT | Performed by: STUDENT IN AN ORGANIZED HEALTH CARE EDUCATION/TRAINING PROGRAM

## 2025-05-13 PROCEDURE — 64636 DESTROY L/S FACET JNT ADDL: CPT

## 2025-05-13 RX ORDER — TRIAMCINOLONE ACETONIDE 40 MG/ML
INJECTION, SUSPENSION INTRA-ARTICULAR; INTRAMUSCULAR
Status: COMPLETED | OUTPATIENT
Start: 2025-05-13 | End: 2025-05-13

## 2025-05-13 RX ORDER — LIDOCAINE HYDROCHLORIDE 20 MG/ML
INJECTION, SOLUTION EPIDURAL; INFILTRATION; INTRACAUDAL; PERINEURAL
Status: COMPLETED | OUTPATIENT
Start: 2025-05-13 | End: 2025-05-13

## 2025-05-13 RX ORDER — LIDOCAINE HYDROCHLORIDE 10 MG/ML
INJECTION, SOLUTION EPIDURAL; INFILTRATION; INTRACAUDAL; PERINEURAL
Status: COMPLETED | OUTPATIENT
Start: 2025-05-13 | End: 2025-05-13

## 2025-05-13 RX ADMIN — TRIAMCINOLONE ACETONIDE 40 MG: 40 INJECTION, SUSPENSION INTRA-ARTICULAR; INTRAMUSCULAR at 12:12

## 2025-05-13 RX ADMIN — LIDOCAINE HYDROCHLORIDE 5 ML: 20 INJECTION, SOLUTION EPIDURAL; INFILTRATION; INTRACAUDAL; PERINEURAL at 12:10

## 2025-05-13 RX ADMIN — LIDOCAINE HYDROCHLORIDE 2 ML: 10 INJECTION, SOLUTION EPIDURAL; INFILTRATION; INTRACAUDAL; PERINEURAL at 12:13

## 2025-05-13 ASSESSMENT — PAIN - FUNCTIONAL ASSESSMENT
PAIN_FUNCTIONAL_ASSESSMENT: ACTIVITIES ARE NOT PREVENTED
PAIN_FUNCTIONAL_ASSESSMENT: NONE - DENIES PAIN
PAIN_FUNCTIONAL_ASSESSMENT: 0-10

## 2025-05-13 ASSESSMENT — PAIN DESCRIPTION - DESCRIPTORS: DESCRIPTORS: ACHING

## 2025-05-13 NOTE — OP NOTE
PROCEDURE PERFORMED: Left Lumbar Medial Branch Radiofrequency Ablation using Fluoroscopy    PREOPERATIVE DIAGNOSIS: Lumbosacral spondylosis    INDICATIONS: Chronic low back pain    The patient's history and physical exam were reviewed.  The risk, benefits, and alternatives of the procedure were discussed and all questions were answered to the patient's satisfaction.  The patient agreed to proceed and written informed consent was obtained.    POSTOPERATIVE DIAGNOSIS: Lumbosacral spondylosis    PHYSICIAN:  Dr. Ryan Forte DO    ANESTHESIA:  LOCAL    ASSISTANT:  NONE    PATHOLOGY:  NONE    ESTIMATED BLOOD LOSS:  N/A    IMPLANTS:  NONE    PROCEDURE DESCRIPTION: Left lumbar medial branch radiofrequency ablation using fluoroscopy    The patient was placed on the operative bed in prone position.  The area was prepped with  Chlorhexidine.  The area was then draped in a sterile fashion.    Targeted levels: Left lumbar L3, L4, L5 medial branch radiofrequency ablation    An AP  fluoroscopy image was used to identify and dakota Danielson's point at the L3, L4 levels on the targeted side.  Additionally, the junction of the SAP and sacral ala was also marked on the same side.  The skin and subcutaneous tissues were then anesthetized with 1% lidocaine. At each lumbar medial branch, a 20-gauge, 100 mm curved with 10 mm active tip probe was advanced under AP fluoroscopic projections until bone was contacted along the medial aspect of the transverse process.  Aspiration of each needle was negative for blood, CSF and paresthesia prior to injection.   Motor stimulation at 2 Hz and 1.2 V was negative.  Then, after negative aspiration, 1 mL lidocaine 2% was injected at each level prior to lesioning which was performed at 80°C for 90 seconds.  Once the lesion was complete, injectate solution containing Kenalog 40 mg and 2 mL lidocaine 1% was injected at each site with a total of 1 mL.  The probes were then removed.  The needle sites

## 2025-05-13 NOTE — DISCHARGE INSTRUCTIONS

## 2025-05-13 NOTE — H&P
Units       Allergies   Allergen Reactions    Morphine Anaphylaxis and Itching    Varenicline Anaphylaxis    Nicotine Other (See Comments)     Reaction-Localized arm pain       Review of Systems:   Focused review of systems was performed, and negative as pertinent to diagnosis, except as stated in HPI.      Physical Exam  Constitutional:       Appearance: Normal appearance.   Pulmonary:      Effort: Pulmonary effort is normal.   Neurological:      Mental Status: alert.   Psychiatric:         Attention and Perception: Attention and perception normal.         Mood and Affect: Mood and affect normal.   Cardiovascular:      Rate: Normal rate.       Patient's current physical status, medications, medical history, and HPI have been reviewed and updated as appropriate on this date: 05/13/25    Risk/Benefit(s): The risks, benefits, alternatives, and potential complications have been discussed with the patient/family and informed consent has been obtained for the procedure/sedation.    Diagnosis:   Lumbosacral spondylosis      Plan: Left lumbar L3, L4, L5 RFA      ASA CLASSIFICATION  2  MP   CLASSIFICATION  2    Ryan Forte DO